# Patient Record
Sex: FEMALE | Race: WHITE | Employment: OTHER | URBAN - METROPOLITAN AREA
[De-identification: names, ages, dates, MRNs, and addresses within clinical notes are randomized per-mention and may not be internally consistent; named-entity substitution may affect disease eponyms.]

---

## 2024-07-18 ENCOUNTER — TELEPHONE (OUTPATIENT)
Age: 89
End: 2024-07-18

## 2024-07-18 NOTE — TELEPHONE ENCOUNTER
Patient's PCP Dr Sanabria's office called to check on the status of the referral and to see if the patient had been [email or web address]    I informed the office that the patient was contacted but the referral was closed. No additional info was available.    No appointment was scheduled.    No further action is needed at this time.